# Patient Record
(demographics unavailable — no encounter records)

---

## 2025-03-28 NOTE — END OF VISIT
[] : Resident [FreeTextEntry3] : vasc lab studies show persistent tibial/small vessel disease plan repeat angio, pt may be candidate for TADV betadine/local wound care to the 5th digit [Time Spent: ___ minutes] : I have spent [unfilled] minutes of time on the encounter which excludes teaching and separately reported services.

## 2025-03-28 NOTE — HISTORY OF PRESENT ILLNESS
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 Patient reports persistent RLE both with rest and activity. RLE 5th toe wound dry without evidence of active infection, denies fever/chills, or drainage from foot. Arterial duplex performed showing patent SFA stent, DAYANNA/PVR with absent waveform in distal RLE.

## 2025-03-28 NOTE — ASSESSMENT
[FreeTextEntry1] : PAD (peripheral artery disease) (443.9) (I73.9) S/p repeat RLE angio with SFA & popliteal Shockwave lithotripsy, DCB/stent, and PT Esprit scaffold 1/23/24 coming for OP f/u visit. Continued RLE pain. Art duplex showing patent SFA stent, DAYANNA/PVR with absent waveform in distal RLE.  Plan - will need repeat RLE angio likely under anesthesia given intolerance to lay flat - DC eliquis and c/w ASA - cont local wound care with betadyne and gauze - Will plan to schedule angio within next 1-2 weeks

## 2025-03-28 NOTE — PHYSICAL EXAM
[Normal Rate and Rhythm] : normal rate and rhythm [0] : right 0 [2+] : left 2+ [JVD] : no jugular venous distention  [Ankle Swelling (On Exam)] : not present [Varicose Veins Of Lower Extremities] : not present [] : not present [de-identified] : no acute distress noted [de-identified] : non-labored breathing [FreeTextEntry1] : Non-palpable R pedal pulse, R 5th toe dry gangrenous changes, no surrounding cellulitis.

## 2025-03-28 NOTE — PHYSICAL EXAM
[Normal Rate and Rhythm] : normal rate and rhythm [0] : right 0 [2+] : left 2+ [JVD] : no jugular venous distention  [Ankle Swelling (On Exam)] : not present [Varicose Veins Of Lower Extremities] : not present [] : not present [de-identified] : no acute distress noted [de-identified] : non-labored breathing [FreeTextEntry1] : Non-palpable R pedal pulse, R 5th toe dry gangrenous changes, no surrounding cellulitis.

## 2025-05-02 NOTE — END OF VISIT
[] : Resident [FreeTextEntry3] : pt w persistent pain duplex shows distal AT occlusion plan repeat angio, revasc start gabapentin for pain, rx sent [Time Spent: ___ minutes] : I have spent [unfilled] minutes of time on the encounter which excludes teaching and separately reported services.

## 2025-05-02 NOTE — HISTORY OF PRESENT ILLNESS
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 Arterial duplex performed showing patent SFA stent, DAYANNA/PVR with absent waveform in the distal RLE. Patient is now status post RLE angiogram showing patent SFA stent and long AT occlusion, now status post angioplasty with 1 vessel runoff to the foot. Patient reports no improvement in rest pain since after the angiogram and angioplasty. Still endorsing baseline rest pain and claudication. Stable right 5th toe dry gangrene. Will get in-office DAYANNA/PVRs and duplex today (05/02/25)

## 2025-05-02 NOTE — PHYSICAL EXAM
[Normal Breath Sounds] : Normal breath sounds [0] : left 0 [Alert] : alert [Oriented to Person] : oriented to person [Oriented to Place] : oriented to place [Oriented to Time] : oriented to time [Calm] : calm [JVD] : no jugular venous distention  [de-identified] : Well appearing, no acute distress  [FreeTextEntry1] : Right 5th toe dry gangrene. Motor and sensory intact.

## 2025-05-02 NOTE — ASSESSMENT
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

## 2025-06-20 NOTE — PHYSICAL EXAM
[FreeTextEntry1] : Patient appears to be in significant pain.  He is accompanied by his daughter.  He is alert and well-oriented.  Speech fluent.  No aphasia.  No signs of toxicity.  Right foot/ankle is wrapped not being able to directly evaluate that region.  He ambulates with a walker.  All 4 extremities are able to move above gravity.  No sensory impairment.

## 2025-06-20 NOTE — ASSESSMENT
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  At this time, I will continue to write hydromorphone 2 mg twice a day.  He and his wife are advised potential risks and benefits from this medication.  I am recommending that we decrease tramadol from a total of 200 mg a day to 100 mg/day if necessary.  He is to continue gabapentin at this time.  Urine tox screen was provided.  I stop reviewed.  Follow-up in 4 weeks.  I spent 45 minutes reviewing history, medical records, clinical evaluation, management, discussion of plan.

## 2025-06-20 NOTE — HISTORY OF PRESENT ILLNESS
[FreeTextEntry1] : this is a case of a 73-year-old right-handed gentleman past medical history of coronary artery disease, hypertension, hypercholesterolemia recently hospitalized 3 weeks ago for gangrene involving the right foot.  At that time, patient had amputation of the fifth digit of the right foot.  2 weeks ago he underwent bypass surgery of the right leg with stenting.  Patient is here for pain then reported to be constant but the numeric analog scale of 9/10.  He notes that dressing changes that occur every other day have not significantly increased his pain.  Patient denies phantom sensations or phantom pain.  He is currently on gabapentin 600 mg 3 times daily and tramadol 1/day.  In addition, he takes hydromorphone 2 mg every 4 hours as needed.  At maximum he takes 1 to 2 tablets a day.  He notes that after taking hydromorphone in 30 minutes his pain is significantly reduced to 2/10.  The effect of pain relief is 8 hours.  Left time he took hydromorphone yesterday.  Allergies, family history, social history reviewed with patient.

## 2025-06-27 NOTE — HISTORY OF PRESENT ILLNESS
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 Arterial duplex performed showing patent SFA stent, DAYANNA/PVR with absent waveform in the distal RLE. Patient is now status post RLE angiogram showing patent SFA stent and long AT occlusion, now status post arterlization of the GSV of right lower extremity. Patient endorses lower extremity pain that is unchanged.

## 2025-06-27 NOTE — END OF VISIT
[] : Resident [FreeTextEntry3] : Resident Dianne PGY3 needs angiogram to assess venous circuit. pt has pain, tissue loss as expected.  feels the appearance is improved but pain is constant.  plan angio [Time Spent: ___ minutes] : I have spent [unfilled] minutes of time on the encounter which excludes teaching and separately reported services.

## 2025-06-27 NOTE — ASSESSMENT
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  Plan: - Right lower extremity ultrasound showing fem-PTA GSV bypass with stenosis at distal thigh and knee and occluded at proximal calf. - Sutures and staples were removed at this visit.

## 2025-06-27 NOTE — PHYSICAL EXAM
[Normal Breath Sounds] : Normal breath sounds [2+] : right 2+ [Alert] : alert [Oriented to Person] : oriented to person [Oriented to Place] : oriented to place [Oriented to Time] : oriented to time [Calm] : calm [JVD] : no jugular venous distention  [Ankle Swelling (On Exam)] : not present [Varicose Veins Of Lower Extremities] : not present [] : not present [Tender] : nontender [Enlarged] : not enlarged [de-identified] : Not in acute distress [de-identified] : Atrumatic. Supple neck  [de-identified] : CRAIGR [FreeTextEntry1] : Palpable pulses on the right GSV up to the mid shin. [de-identified] : Soft, NT/ND